# Patient Record
Sex: FEMALE | Race: WHITE | NOT HISPANIC OR LATINO | Employment: FULL TIME | URBAN - METROPOLITAN AREA
[De-identification: names, ages, dates, MRNs, and addresses within clinical notes are randomized per-mention and may not be internally consistent; named-entity substitution may affect disease eponyms.]

---

## 2019-10-30 ENCOUNTER — OFFICE VISIT (OUTPATIENT)
Dept: OPTOMETRY | Facility: CLINIC | Age: 29
End: 2019-10-30
Payer: COMMERCIAL

## 2019-10-30 DIAGNOSIS — H16.223 KERATOCONJUNCTIVITIS SICCA, NOT SPECIFIED AS SJÖGREN'S, BILATERAL: Primary | ICD-10-CM

## 2019-10-30 PROCEDURE — 99999 PR PBB SHADOW E&M-EST. PATIENT-LVL III: CPT | Mod: PBBFAC,,, | Performed by: OPTOMETRIST

## 2019-10-30 PROCEDURE — 99999 PR PBB SHADOW E&M-EST. PATIENT-LVL III: ICD-10-PCS | Mod: PBBFAC,,, | Performed by: OPTOMETRIST

## 2019-10-30 PROCEDURE — 92004 PR EYE EXAM, NEW PATIENT,COMPREHESV: ICD-10-PCS | Mod: S$GLB,,, | Performed by: OPTOMETRIST

## 2019-10-30 PROCEDURE — 92004 COMPRE OPH EXAM NEW PT 1/>: CPT | Mod: S$GLB,,, | Performed by: OPTOMETRIST

## 2019-10-30 NOTE — PROGRESS NOTES
HPI     Annual eye exam and contact fit   Blurry vision OU  Dailies Total 1 day CLs, Part time use    Last edited by Gustavo Langston, OD on 10/30/2019  3:41 PM. (History)            Assessment /Plan     For exam results, see Encounter Report.    Keratoconjunctivitis sicca, not specified as Sjögren's, bilateral  -Systane PRN  -Dailies lenses  Eyeglass Final Rx     Eyeglass Final Rx       Sphere Cylinder Dist VA    Right -1.75 Sphere 20/20    Left -2.00 Sphere 20/20    Type:  SVL    Expiration Date:  10/30/2020              Contact Lens Final Rx     Final Contact Lens Rx       Brand Base Curve Diameter Sphere Cylinder    Right Dailies Total 1 8.50 14.1 -1.75 Sphere    Left Dailies Total 1 8.50 14.1 -1.75 Sphere    Expiration Date:  10/30/2020    Replacement:  Daily    Wearing Schedule:  Daily wear                  RTC 1 yr

## 2020-12-30 ENCOUNTER — IMMUNIZATION (OUTPATIENT)
Dept: INTERNAL MEDICINE | Facility: CLINIC | Age: 30
End: 2020-12-30
Payer: COMMERCIAL

## 2020-12-30 DIAGNOSIS — Z23 NEED FOR VACCINATION: ICD-10-CM

## 2020-12-30 PROCEDURE — 0001A COVID-19, MRNA, LNP-S, PF, 30 MCG/0.3 ML DOSE VACCINE: ICD-10-PCS | Mod: CV19,,, | Performed by: FAMILY MEDICINE

## 2020-12-30 PROCEDURE — 91300 COVID-19, MRNA, LNP-S, PF, 30 MCG/0.3 ML DOSE VACCINE: CPT | Mod: ,,, | Performed by: FAMILY MEDICINE

## 2020-12-30 PROCEDURE — 91300 COVID-19, MRNA, LNP-S, PF, 30 MCG/0.3 ML DOSE VACCINE: ICD-10-PCS | Mod: ,,, | Performed by: FAMILY MEDICINE

## 2020-12-30 PROCEDURE — 0001A COVID-19, MRNA, LNP-S, PF, 30 MCG/0.3 ML DOSE VACCINE: CPT | Mod: CV19,,, | Performed by: FAMILY MEDICINE

## 2021-01-20 ENCOUNTER — IMMUNIZATION (OUTPATIENT)
Dept: INTERNAL MEDICINE | Facility: CLINIC | Age: 31
End: 2021-01-20
Payer: OTHER GOVERNMENT

## 2021-01-20 DIAGNOSIS — Z23 NEED FOR VACCINATION: Primary | ICD-10-CM

## 2021-01-20 PROCEDURE — 91300 COVID-19, MRNA, LNP-S, PF, 30 MCG/0.3 ML DOSE VACCINE: CPT | Mod: PBBFAC | Performed by: FAMILY MEDICINE

## 2021-01-20 PROCEDURE — 0002A COVID-19, MRNA, LNP-S, PF, 30 MCG/0.3 ML DOSE VACCINE: CPT | Mod: PBBFAC | Performed by: FAMILY MEDICINE

## 2021-09-30 ENCOUNTER — IMMUNIZATION (OUTPATIENT)
Dept: INTERNAL MEDICINE | Facility: CLINIC | Age: 31
End: 2021-09-30
Payer: OTHER GOVERNMENT

## 2021-09-30 DIAGNOSIS — Z23 NEED FOR VACCINATION: Primary | ICD-10-CM

## 2021-09-30 PROCEDURE — 0003A COVID-19, MRNA, LNP-S, PF, 30 MCG/0.3 ML DOSE VACCINE: CPT | Mod: CV19,PBBFAC | Performed by: INTERNAL MEDICINE

## 2021-09-30 PROCEDURE — 91300 COVID-19, MRNA, LNP-S, PF, 30 MCG/0.3 ML DOSE VACCINE: CPT | Mod: PBBFAC

## 2022-09-06 DIAGNOSIS — R05.9 COUGH: Primary | ICD-10-CM

## 2022-09-06 LAB
CTP QC/QA: YES
SARS-COV-2 AG RESP QL IA.RAPID: POSITIVE

## 2023-08-11 ENCOUNTER — PATIENT MESSAGE (OUTPATIENT)
Dept: RESEARCH | Facility: HOSPITAL | Age: 33
End: 2023-08-11
Payer: COMMERCIAL

## 2024-07-22 DIAGNOSIS — B00.2 ORAL HERPES: Primary | ICD-10-CM

## 2024-07-22 RX ORDER — VALACYCLOVIR HYDROCHLORIDE 1 G/1
1000 TABLET, FILM COATED ORAL 2 TIMES DAILY
Qty: 20 TABLET | Refills: 3 | Status: SHIPPED | OUTPATIENT
Start: 2024-07-22

## 2024-10-10 ENCOUNTER — OFFICE VISIT (OUTPATIENT)
Dept: PSYCHIATRY | Facility: CLINIC | Age: 34
End: 2024-10-10
Payer: COMMERCIAL

## 2024-10-10 VITALS
WEIGHT: 123.13 LBS | BODY MASS INDEX: 22.66 KG/M2 | SYSTOLIC BLOOD PRESSURE: 109 MMHG | DIASTOLIC BLOOD PRESSURE: 72 MMHG | HEIGHT: 62 IN | HEART RATE: 76 BPM

## 2024-10-10 DIAGNOSIS — F90.0 ADHD (ATTENTION DEFICIT HYPERACTIVITY DISORDER), INATTENTIVE TYPE: Primary | ICD-10-CM

## 2024-10-10 LAB
AMPHET+METHAMPHET UR QL: NEGATIVE
BARBITURATES UR QL SCN>200 NG/ML: NEGATIVE
BENZODIAZ UR QL SCN>200 NG/ML: NEGATIVE
BZE UR QL SCN: NEGATIVE
CANNABINOIDS UR QL SCN: NEGATIVE
CREAT UR-MCNC: 34 MG/DL (ref 15–325)
ETHANOL UR-MCNC: <10 MG/DL
METHADONE UR QL SCN>300 NG/ML: NEGATIVE
OPIATES UR QL SCN: NEGATIVE
PCP UR QL SCN>25 NG/ML: NEGATIVE
TOXICOLOGY INFORMATION: NORMAL

## 2024-10-10 PROCEDURE — 80307 DRUG TEST PRSMV CHEM ANLYZR: CPT

## 2024-10-10 PROCEDURE — 99999 PR PBB SHADOW E&M-EST. PATIENT-LVL III: CPT | Mod: PBBFAC,,,

## 2024-10-10 RX ORDER — DEXTROAMPHETAMINE SACCHARATE, AMPHETAMINE ASPARTATE, DEXTROAMPHETAMINE SULFATE AND AMPHETAMINE SULFATE 2.5; 2.5; 2.5; 2.5 MG/1; MG/1; MG/1; MG/1
10 TABLET ORAL 3 TIMES DAILY
Qty: 90 TABLET | Refills: 0 | Status: SHIPPED | OUTPATIENT
Start: 2024-10-10

## 2024-10-10 NOTE — PROGRESS NOTES
"OUTPATIENT PSYCHIATRY INITIAL VISIT    ENCOUNTER DATE:  10/10/2024  SITE:  [facility]  REFFERAL SOURCE:  Self, Aaareferral  LENGTH OF SESSION:  60 minutes        CHIEF COMPLAINT:   No chief complaint on file.      HISTORY OF PRESENTING ILLNESS:  Betina Elmore is a 34 y.o. female with history of ADHD who presents for initial assessment.      History as told by Patient -   Patient reports seeing a non-[facility] Psychiatrist, Dr. Martinez for 8 years. Reports Dr. Martinez has since suddenly retired and the patient has needed to. Reports she was treating her for ADHD.     Patient reports she was diagnosed with ADHD in 2007 by her PCP at the time and was managed by her PCP initially. Patient reports that as a child her doctors noticed easily distractibility and "loss of time" due to distractibility. Reports her mother informed her that her pediatrician had recommended medications in childhood but her mother did not want to do this. Reports she did well in school in classes that she was interested in, but in tedious classes she reports getting C's. Reports significant difficulty focusing on these classes that she found tedious. Reports during softball games and practice she would often day dream and have to be yelled at to be aware of where the ball was.  Reports at home she would get in trouble for being a "messy kid" and describes disorganization. Reports her mother her mother told her she was reactive as a child. She reports procrastination of work dating back to elementary school.Patient reports she has difficulty remembering parts of her childhood.   Patient reports discussing this later with her PCP after graduating highschool and thus was diagnosed. Patient reports seeking care because she felt she was struggling with class in a way her peers were not and felt she was not living up to her potential. Reports she was initially hesitant towards taking medications but she feels the medication " "significantly helps her on a day to day basis.     Reports she currently lives in "disorganized chaos". Reports when she gets overwhelmed she has difficulty initiating tasks and organizing. She reports misplacing items frequently. She reports being easily forgetful. She reports frequently zoning out and physically bumping into things as a result such as corners, feels this is also related to her moving too fast. Denies car accidents, reports she is actively trying to be a safe . She reports in some ways she is impulsive, reports impulsively reacting verbally. Reports interrupting others in conversation and feels she needs to express what she is thinking in that very moment as they come into her mind. Reports she has to actively prevent herself from doing this. Reports difficulty with racing thoughts, finds running is helpful for this. Reports she struggles when she has nothing to do and will feel restless. Reports she feels it is "physically and mentally exhausting" when she has nothing to do. Reports she has difficulty sustaining attention to television shows. Reports she has difficulty waiting in line and waiting her turn which she often avoids and if she is unable to avoid this results in irritability. She endorses difficulty with working memory in terms of remembering short lists without writing things down. Patient reports significant difficulty with future planning, especially if there are multi-steps in the process. She reports difficulty with procrastination and often puts things off to the last minute or is late, cites being behind on continuing education at the moment.     Reports hx of medication trials such as Vyvanse and Adderall XR. Reports extended release formulations have interfered with her ability to sleep in the past and made her feel like her heart was racing. Patient reports most recently being prescribed Adderall 10 mg TID. Denies adverse effects from this medication and feels it did not " interfere with her ability to sleep. Denies chest pain, chest tightness, palpitations. Reports she would take the first dose around 6:30 am, the second dose around 9;30 - 10;00, and then the last dose at 12:00 pm/ reports not taking a dose after 1:00 pm because this will interefere with her sleep. Reports she takes drug holidays, at least 1 day per week. Report she has been without Adderall for 2-3 weeks. Reports with medications the above symptoms are much more manageable for the patient. She reports feeling more stable on the medication. Denies personal cardiac hx or Fhx sudden cardiac death.     Denies hx of depression and anxiety diagnosis. Reports hx of experiencing anxiety following the passing of her Father and Sister. Reports she worried about things like mortality. Reports worrying about bad things happening such as fires or natural disasters. Denies feeling anxious more days than not. Reports when she feels this is difficult to control she uses running and mindfulness activities as coping skills. Reports running and mindfulness help. Denies any previous medications trials for anxiety.    PSYCHIATRIC REVIEW OF SYMPTOMS: (Is patient experiencing or having changes in any of the following?)    Symptoms of Depression: + loss of interest/anhedonia; Denies diminished mood or  irritability, diminished energy, change in sleep, change in appetite, diminished concentration or cognition or indecisiveness, PMA/R, excessive guilt or hopelessness or worthlessness, Denies suicidal ideations - Passive/ Active or Self injurious behaviors    Symptoms of SUSANNA: as above    Symptoms of Panic Attacks: Denies recurrent panic attacks.    Symptoms of marcia or hypomania: Denies elevated, expansive, or irritable mood with increased energy or activity; with inflated self-esteem or grandiosity, decreased need for sleep, increased rate of speech,  racing thoughts, distractibility, increased goal directed activity or PMA,  risky/disinhibited behavior.    Symptoms of psychosis: Denies hallucinations, delusions, disorganized thinking, disorganized behavior or abnormal motor behavior, or negative symptoms (diminshed emotional expression, avolition, anhedonia, alogia, asociality     Symptoms of PTSD: Denies h/o trauma - physical abuse /sexual abuse / domestic violence/ other traumas); re-experiencing/intrusive symptoms, nightmares, avoidant behavior, negative alterations in cognition or mood, or hyperarousal symptoms; with or without dissociative symptoms     Sleep: Endorses hx of sleep paralysis starting back in childhood. Reports this happens every few weeks. Endorses hx of hypnopompic/hypnagogic hallucinations in childhood.     Other Psych ROS:    Symptoms of OCD: Denies obsessions, compulsions or ruminations    Symptoms of Eating Disorders: Denies anorexia, bulimia or binge eating    Symptoms of ADHD: +inattention since childhood    Risk Parameters:  Patient reports no suicidal ideation  Patient reports no homicidal ideation  Patient reports no self-injurious behavior  Patient reports no violent behavior    PSYCHIATRIC MED REVIEW    Current psych meds  Medication side effects:  denies  Medication compliance:  yes    Previous psych meds trials    PAST PSYCHIATRIC HISTORY:  Previous Psychiatric Diagnoses: ADHD  Previous Psychiatric Hospitalizations: denies   Previous SI/HI:   denies  Previous Suicide Attempts:  denies  Previous Self injurious behaviors: denies  Previous Medication Trials:  Adderall XR, Vyvanse  Psychiatric Care (current & past):  Dr. Martinez  History of Psychotherapy:  was working with a therapist a few months ago, she moved away  History of Violence: denies    SUBSTANCE ABUSE HISTORY:  Caffeine: coffee 16 oz per day, doesn't drink caffeine after 11 am  Tobacco:  denies  Alcohol:  2-3 times per month  Illicit Substances:  denies  Misuse of Prescription Medications: denies  Other: Herbal supplements/ online supplements:  denies    FAMILY HISTORY:  Psychiatric:  Younger brother - depression/anxiety  Family H/o suicide:  denies    SOCIAL HISTORY:  Developmental/Childhood:Achieved all developmental milestones timely  Education:Bachelor's Degree  Employment Status/Finances:Employed   Relationship Status/Sexual Orientation:   Children: 0  Housing Status: Home alone   history:  NO  Access to Firearms: NO  Synagogue:Spiritual without formal affiliation  Recreational activities: running    LEGAL HISTORY:   Past charges/incarcerations: No   Pending charges:No     NEUROLOGIC HISTORY:  Seizures:  denies   Head trauma:  denies    MEDICAL REVIEW OF SYSTEMS:  Complete review of systems performed covering Constitutional, Cardiovascular, Respiratory, Gastrointestinal, Musculoskeletal, Skin, Neurologic and Endocrine  All systems negative.    MEDICAL HISTORY:  No past medical history on file.    ALL MEDICATIONS:    Current Outpatient Medications:     clindamycin (CLEOCIN) 300 MG capsule, Take 1 capsule (300 mg total) by mouth 4 (four) times daily., Disp: 28 capsule, Rfl: 1    dextroamphetamine-amphetamine 10 mg Tab, Take one tablet by mouth three times daily, Disp: 90 tablet, Rfl: 0    dextroamphetamine-amphetamine 10 mg Tab, Take 1 tablet by mouth three times daily, Disp: 90 tablet, Rfl: 0    dextroamphetamine-amphetamine 10 mg Tab, Take 1 tablet (10 mg total) by mouth 3 (three) times daily., Disp: 90 tablet, Rfl: 0    ondansetron (ZOFRAN-ODT) 4 MG TbDL, Dissolve 1 tablet (4 mg total) by mouth daily as needed., Disp: 20 tablet, Rfl: 0    sulfamethoxazole-trimethoprim 800-160mg (BACTRIM DS) 800-160 mg Tab, TAKE 1 TABLET BY MOUTH TWICE DAILY, Disp: 6 tablet, Rfl: 0    valACYclovir (VALTREX) 1000 MG tablet, take 2 tablets by mouth every 12 hours, Disp: 8 tablet, Rfl: 1    valACYclovir (VALTREX) 1000 MG tablet, Take 1 tablet (1,000 mg total) by mouth 2 (two) times a day., Disp: 20 tablet, Rfl: 3    OTC: Tums    ALLERGIES:  Review of patient's  "allergies indicates:   Allergen Reactions    Penicillin g Hives and Itching       RELEVANT LABS/STUDIES:    No results found for: "WBC", "HGB", "HCT", "MCV", "PLT"  BMP  No results found for: "NA", "K", "CL", "CO2", "BUN", "CREATININE", "CALCIUM", "ANIONGAP", "ESTGFRAFRICA", "EGFRNONAA"  No results found for: "ALT", "AST", "GGT", "ALKPHOS", "BILITOT"  No results found for: "TSH"  No results found for: "LABA1C", "HGBA1C"      VITALS  Vitals:    10/10/24 0812   BP: 109/72   Pulse: 76   Weight: 55.8 kg (123 lb 2 oz)   Height: 5' 2" (1.575 m)       PHYSICAL EXAM  General: well developed, well nourished  Neurologic:   Gait: Normal   Psychomotor signs:  No involuntary movements or tremor  AIMS: none    PSYCHIATRIC EXAM:    Mental Status Exam:  Appearance: unremarkable, age appropriate  Behavior/Cooperation: limited/ appropriate normal, cooperative  Speech:  normal tone, normal rate, normal pitch, normal volume  Language: uses words appropriately; NO aphasia or dysarthria  Mood:  "fine"  Affect:  full, reactive, appropriate  Thought Process: normal and logical  Thought Content: normal, no suicidality, no homicidality, delusions, or paranoia  Level of Consciousness: Alert and Oriented x3  Memory:  Intact   3/3 immediate, 3/3 at 5 minutes    Recent:  Intact; able to report recent events   Remote:  Intact; Named 4/4 past presidents   Attention/concentration: appropriate for age/education.   Fund of Knowledge: appears adequate  Insight:   Intact  Judgment:  Intact       IMPRESSION:    Betina Elmore is a 34 y.o. female with history of ADHD who presents for initial assessment.    DIAGNOSES:    ICD-10-CM ICD-9-CM   1. ADHD (attention deficit hyperactivity disorder), inattentive type  F90.0 314.00       PLAN:  Psych Med:  Continue Adderall 10 mg TID for ADHD  VS reviewed as stable  PDMP reviewed without discrepancies  Denies cp, palpitations, chest tightness  Denies personal cardiac hx, Fhx sudden cardiac death  Discussed with " patient informed consent, risks vs. benefits, alternative treatments, side effect profile and the inherent unpredictability of individual responses to these treatments. Answered any questions patient may have had. The patient expresses understanding of the above and displays the capacity to agree with this current plan     Other: UDS obtained at this visit    RETURN TO CLINIC:  4 weeks    Evelin Birch MD   [facility] PGY-III  10/10/2024 8:15 AM

## 2024-11-15 ENCOUNTER — OFFICE VISIT (OUTPATIENT)
Dept: PSYCHIATRY | Facility: CLINIC | Age: 34
End: 2024-11-15
Payer: COMMERCIAL

## 2024-11-15 VITALS
DIASTOLIC BLOOD PRESSURE: 72 MMHG | WEIGHT: 121.06 LBS | BODY MASS INDEX: 22.14 KG/M2 | HEART RATE: 70 BPM | SYSTOLIC BLOOD PRESSURE: 112 MMHG

## 2024-11-15 DIAGNOSIS — F90.0 ADHD (ATTENTION DEFICIT HYPERACTIVITY DISORDER), INATTENTIVE TYPE: Primary | ICD-10-CM

## 2024-11-15 DIAGNOSIS — F41.9 ANXIETY DISORDER, UNSPECIFIED TYPE: ICD-10-CM

## 2024-11-15 PROCEDURE — 99999 PR PBB SHADOW E&M-EST. PATIENT-LVL II: CPT | Mod: PBBFAC,,,

## 2024-11-15 RX ORDER — DEXTROAMPHETAMINE SACCHARATE, AMPHETAMINE ASPARTATE, DEXTROAMPHETAMINE SULFATE AND AMPHETAMINE SULFATE 2.5; 2.5; 2.5; 2.5 MG/1; MG/1; MG/1; MG/1
10 TABLET ORAL 3 TIMES DAILY
Qty: 90 TABLET | Refills: 0 | Status: SHIPPED | OUTPATIENT
Start: 2024-11-15

## 2024-11-15 NOTE — PROGRESS NOTES
"OUTPATIENT PSYCHIATRY FOLLOW UP VISIT    ENCOUNTER DATE:  11/15/2024  SITE:  [facility]  LENGTH OF SESSION:  30 minutes      CHIEF COMPLAINT:  No chief complaint on file.      HISTORY OF PRESENTING ILLNESS:  Betina Elmore is a 34 y.o. female with history of ADHD who presents for follow up appointment.      Plan at last appointment on 10/10/2024:  Continue Adderall 10 mg TID for ADHD  VS reviewed as stable  PDMP reviewed without discrepancies  Denies cp, palpitations, chest tightness  Denies personal cardiac hx, Fhx sudden cardiac death    Interval history as told by Patient -     Taking Adderall as prescribed. Takes one dose at approx. 6:30 - 7:30 am, takes second dose 2-3 hrs later, then last dose 2 hours after last dose. Tries to not take medication after 12:30 -1:00 pm. Denies forgetting to take medication. Takes 1 weekend day drug holiday. Reports her mood has been stable and even "better" since last visit. Notices some changes with mood on gloomy days. Reports anxiety has been okay, has been trying to use coping skills such as breathing. Cites she has been more able to manage fear of fire happening in her apartment. Feels the anxiety is most of the time manageable. Cites days when the weather is bad she notices her anxiety is worse. Reports she has thought about treating anxiety and depression. Worries about starting SSRI/SNRI for fear of increased anxiety. Worries about discontinuation syndromes. Worries about weight gain. Discussed SSRI options v. Alternative options such as Buspar.    PSYCHIATRIC REVIEW OF SYSTEMS:(none/ yes- better/worse/stable/& what symptoms)    Symptoms of Depression: improved    Symptoms of Anxiety/ panic attacks: stable, practicing coping skills    Symptoms of Park/Hypomania: denies    Symptoms of psychosis: denies AVH or paranoia    Sleep: stable    Appetite: stable, drinks water    Other: ADHD well managed    Alcohol: 2-3x per month    Illicit Drugs: " denies    Psychosocial stressors: work, fear of fire in her appartment    Risk Parameters:  Patient reports no suicidal ideation  Patient reports no homicidal ideation  Patient reports no self-injurious behavior  Patient reports no violent behavior    PSYCHIATRIC MED REVIEW    Current psych meds  Medication side effects:  denies  Medication compliance:  yes    Previous psych meds trials  Vyvanse - insomnia  Adderall XR - insomnia     PAST PSYCHIATRIC, MEDICAL, AND SOCIAL HISTORY REVIEWED  The patient's past medical, family and social history have been reviewed and updated as appropriate within the electronic medical record - see encounter notes.    MEDICAL REVIEW OF SYSTEMS:  Complete review of systems performed covering Constitutional, Musculoskeletal, Neurologic.  All systems negative.    ALL MEDICATIONS:    Current Outpatient Medications:     clindamycin (CLEOCIN) 300 MG capsule, Take 1 capsule (300 mg total) by mouth 4 (four) times daily., Disp: 28 capsule, Rfl: 1    clindamycin (CLEOCIN) 300 MG capsule, Take one capsule by mouth three times daily until gone., Disp: 14 capsule, Rfl: 0    dextroamphetamine-amphetamine 10 mg Tab, Take 1 tablet (10 mg total) by mouth 3 (three) times daily., Disp: 90 tablet, Rfl: 0    ibuprofen (ADVIL,MOTRIN) 600 MG tablet, Take 1 tablet by mouth every 6 hours as needed for pain, Disp: 20 tablet, Rfl: 1    sulfamethoxazole-trimethoprim 800-160mg (BACTRIM DS) 800-160 mg Tab, TAKE 1 TABLET BY MOUTH TWICE DAILY, Disp: 6 tablet, Rfl: 0    traMADoL (ULTRAM) 50 mg tablet, take one tablet every 4 hours as needed for pain, Disp: 16 tablet, Rfl: 0    valACYclovir (VALTREX) 1000 MG tablet, take 2 tablets by mouth every 12 hours, Disp: 8 tablet, Rfl: 1    valACYclovir (VALTREX) 1000 MG tablet, Take 1 tablet (1,000 mg total) by mouth 2 (two) times a day., Disp: 20 tablet, Rfl: 3    ALLERGIES:  Review of patient's allergies indicates:   Allergen Reactions    Penicillin g Hives and Itching  "      RELEVANT LABS/STUDIES:    No results found for: "WBC", "HGB", "HCT", "MCV", "PLT"  BMP  No results found for: "NA", "K", "CL", "CO2", "BUN", "CREATININE", "CALCIUM", "ANIONGAP", "ESTGFRAFRICA", "EGFRNONAA"  No results found for: "ALT", "AST", "GGT", "ALKPHOS", "BILITOT"  No results found for: "TSH"  No results found for: "LABA1C", "HGBA1C"    VITALS  Vitals:    11/15/24 0934   BP: 112/72   Pulse: 70   Weight: 54.9 kg (121 lb 0.5 oz)       PHYSICAL EXAM  General: well developed, well nourished  Neurologic:   Gait: Normal   Psychomotor signs:  No involuntary movements or tremor  AIMS: none    PSYCHIATRIC EXAM:     Mental Status Exam:  Appearance: unremarkable, age appropriate  Behavior/Cooperation: normal, cooperative  Speech: normal tone, normal rate, normal pitch, normal volume  Language: uses words appropriately; NO aphasia or dysarthria  Mood:  "better"  Affect: full, reactive, appropriate  Thought Process: normal and logical  Thought Content: normal, no suicidality, no homicidality, delusions, or paranoia  Level of Consciousness: Alert and Oriented x3  Memory:  Intact   3/3 immediate, 3/3 at 5 minutes    Recent:   Intact; able to report recent events   Remote:  Intact; Named 4/4 past presidents   Attention/concentration: appropriate for age/education.   Fund of Knowledge: appears adequate  Insight:   Intact  Judgment:  Intact       IMPRESSION:    Betina Elmore is a 34 y.o. female with history of ADHD who presents for follow up appointment for ADHD and anxiety.    Status/Progress:  Based on the examination today, the patient's problem(s) is/are well controlled.  New problems have not been presented today.     DIAGNOSES:    ICD-10-CM ICD-9-CM   1. ADHD (attention deficit hyperactivity disorder), inattentive type  F90.0 314.00   2. Anxiety disorder, unspecified type  F41.9 300.00       PLAN:  Psych Med:  Continue Adderall 10 mg TID for ADHD  VS reviewed as stable  UDS negative  PDMP reviewed without " discrepancies - last filled 10/10/24 for 30d supply  Denies cp, palpitations, chest tightness  Denies personal cardiac hx, Fhx sudden cardiac death  Patient is considering SSRI v. Buspar for anxiety - wishes to do continued research, will plan to discuss at future visits  Discussed with patient informed consent, risks vs. benefits, alternative treatments, side effect profile and the inherent unpredictability of individual responses to these treatments. Answered any questions patient may have had. The patient expresses understanding of the above and displays the capacity to agree with this current plan     Other: N/A    RETURN TO CLINIC:  4 -6 weeks    Evelin Birch MD   [facility] PGY-III  11/15/2024 9:37 AM

## 2024-11-27 ENCOUNTER — PATIENT MESSAGE (OUTPATIENT)
Dept: PSYCHIATRY | Facility: CLINIC | Age: 34
End: 2024-11-27
Payer: COMMERCIAL

## 2024-12-13 ENCOUNTER — OFFICE VISIT (OUTPATIENT)
Dept: PSYCHIATRY | Facility: CLINIC | Age: 34
End: 2024-12-13
Payer: COMMERCIAL

## 2024-12-13 VITALS
HEART RATE: 79 BPM | BODY MASS INDEX: 21.93 KG/M2 | HEIGHT: 62 IN | WEIGHT: 119.19 LBS | DIASTOLIC BLOOD PRESSURE: 78 MMHG | SYSTOLIC BLOOD PRESSURE: 120 MMHG

## 2024-12-13 DIAGNOSIS — F90.0 ADHD (ATTENTION DEFICIT HYPERACTIVITY DISORDER), INATTENTIVE TYPE: ICD-10-CM

## 2024-12-13 DIAGNOSIS — F41.9 ANXIETY DISORDER, UNSPECIFIED TYPE: Primary | ICD-10-CM

## 2024-12-13 PROCEDURE — 99999 PR PBB SHADOW E&M-EST. PATIENT-LVL II: CPT | Mod: PBBFAC,,,

## 2024-12-13 RX ORDER — BUSPIRONE HYDROCHLORIDE 5 MG/1
TABLET ORAL
Qty: 60 TABLET | Refills: 0 | Status: SHIPPED | OUTPATIENT
Start: 2024-12-13

## 2024-12-13 RX ORDER — DEXTROAMPHETAMINE SACCHARATE, AMPHETAMINE ASPARTATE, DEXTROAMPHETAMINE SULFATE AND AMPHETAMINE SULFATE 2.5; 2.5; 2.5; 2.5 MG/1; MG/1; MG/1; MG/1
10 TABLET ORAL 3 TIMES DAILY
Qty: 90 TABLET | Refills: 0 | Status: SHIPPED | OUTPATIENT
Start: 2024-12-13

## 2024-12-13 NOTE — PROGRESS NOTES
OUTPATIENT PSYCHIATRY FOLLOW UP VISIT    ENCOUNTER DATE:  12/13/2024  SITE:  [facility], [facility]  LENGTH OF SESSION:  30 minutes      CHIEF COMPLAINT:  No chief complaint on file.      HISTORY OF PRESENTING ILLNESS:  Betina Elmore is a 34 y.o. female with history of ADHD who presents for follow up appointment.      Plan at last appointment on 10/10/2024:  Continue Adderall 10 mg TID for ADHD  VS reviewed as stable  UDS negative  PDMP reviewed without discrepancies - last filled 10/10/24 for 30d supply  Denies cp, palpitations, chest tightness  Denies personal cardiac hx, Fhx sudden cardiac death  Patient is considering SSRI v. Buspar for anxiety - wishes to do continued research, will plan to discuss at future visits    Interval history as told by Patient -     Patient reports she things have been going well. Reports anxiety has continued to be problematic. Reports she feels anxiety is worse than baseline and she does not have a reason why. She reports she is open to trying Buspar for anxiety at this time.   Takes Adderall almost daily. Takes one weekend drug holiday. Denies adverse effects including insomnia, suppressed appetite, chest pain, chest tightness, palpitations. Feels Adderall is helpful. Answered questions for patient regarding Buspar.    PSYCHIATRIC REVIEW OF SYSTEMS:(none/ yes- better/worse/stable/& what symptoms)    Symptoms of Depression:stable    Symptoms of Anxiety/ panic attacks: unchanged, interested in medication trial     Symptoms of Park/Hypomania: denies    Symptoms of psychosis: denies AVH or paranoia    Sleep: stable    Appetite: stable, drinks water    Other: ADHD well managed    Alcohol: 2-3x per month, no changes    Illicit Drugs: denies    Psychosocial stressors: work, family/interpersonal relationships, the holidays    Risk Parameters:  Patient reports no suicidal ideation  Patient reports no homicidal ideation  Patient reports no self-injurious behavior  Patient  "reports no violent behavior    PSYCHIATRIC MED REVIEW    Current psych meds  Medication side effects: denies  Medication compliance: yes, takes drug holiday once per weekend    Previous psych meds trials  Vyvanse - insomnia  Adderall XR - insomnia     PAST PSYCHIATRIC, MEDICAL, AND SOCIAL HISTORY REVIEWED  The patient's past medical, family and social history have been reviewed and updated as appropriate within the electronic medical record - see encounter notes.    MEDICAL REVIEW OF SYSTEMS:  Complete review of systems performed covering Constitutional, Musculoskeletal, Neurologic. All systems negative except for cold sore.    ALL MEDICATIONS:    Current Outpatient Medications:     clindamycin (CLEOCIN) 300 MG capsule, Take 1 capsule (300 mg total) by mouth 4 (four) times daily., Disp: 28 capsule, Rfl: 1    clindamycin (CLEOCIN) 300 MG capsule, Take one capsule by mouth three times daily until gone., Disp: 14 capsule, Rfl: 0    dextroamphetamine-amphetamine 10 mg Tab, Take 1 tablet (10 mg total) by mouth 3 (three) times daily., Disp: 90 tablet, Rfl: 0    ibuprofen (ADVIL,MOTRIN) 600 MG tablet, Take 1 tablet by mouth every 6 hours as needed for pain, Disp: 20 tablet, Rfl: 1    sulfamethoxazole-trimethoprim 800-160mg (BACTRIM DS) 800-160 mg Tab, TAKE 1 TABLET BY MOUTH TWICE DAILY, Disp: 6 tablet, Rfl: 0    traMADoL (ULTRAM) 50 mg tablet, take one tablet every 4 hours as needed for pain, Disp: 16 tablet, Rfl: 0    valACYclovir (VALTREX) 1000 MG tablet, take 2 tablets by mouth every 12 hours, Disp: 8 tablet, Rfl: 1    valACYclovir (VALTREX) 1000 MG tablet, Take 1 tablet (1,000 mg total) by mouth 2 (two) times a day., Disp: 20 tablet, Rfl: 3      ALLERGIES:  Review of patient's allergies indicates:   Allergen Reactions    Penicillin g Hives and Itching       RELEVANT LABS/STUDIES:    No results found for: "WBC", "HGB", "HCT", "MCV", "PLT"  BMP  No results found for: "NA", "K", "CL", "CO2", "BUN", "CREATININE", " ""CALCIUM", "ANIONGAP", "ESTGFRAFRICA", "EGFRNONAA"  No results found for: "ALT", "AST", "GGT", "ALKPHOS", "BILITOT"  No results found for: "TSH"  No results found for: "LABA1C", "HGBA1C"    VITALS  Vitals:    12/13/24 1532   BP: 120/78   Pulse: 79   Weight: 54.1 kg (119 lb 2.5 oz)   Height: 5' 2" (1.575 m)       PHYSICAL EXAM  General: well developed, well nourished  Neurologic:   Gait: Normal   Psychomotor signs:  No involuntary movements or tremor  AIMS: none    PSYCHIATRIC EXAM:     Mental Status Exam:  Appearance: unremarkable, age appropriate  Behavior/Cooperation: normal, cooperative  Speech: normal tone, normal rate, normal pitch, normal volume  Language: uses words appropriately; NO aphasia or dysarthria  Mood:  "good"  Affect: full, reactive, appropriate  Thought Process: normal and logical  Thought Content: normal, no suicidality, no homicidality, delusions, or paranoia  Level of Consciousness: Alert and Oriented x3  Memory:  Intact   3/3 immediate, 3/3 at 5 minutes    Recent:   Intact; able to report recent events   Remote:  Intact; Named 4/4 past presidents   Attention/concentration: appropriate for age/education.   Fund of Knowledge: appears adequate  Insight:   Intact  Judgment:  Intact       IMPRESSION:    Betina Elmore is a 34 y.o. female with history of ADHD who presents for follow up appointment for ADHD and anxiety.    Status/Progress:  Based on the examination today, the patient's problem(s) is/are well controlled. New problems have not been presented today.     DIAGNOSES:    ICD-10-CM ICD-9-CM   1. Anxiety disorder, unspecified type  F41.9 300.00   2. ADHD (attention deficit hyperactivity disorder), inattentive type  F90.0 314.00       PLAN:  Psych Med:  START Buspar 5 mg daily for one week and then 5 mg BID thereafter if tolerating well  Continue Adderall 10 mg TID for ADHD  VS reviewed as stable  UDS negative  PDMP reviewed without discrepancies - last filled 11/15/24 for 30d supply  Denies " cp, palpitations, chest tightness  Denies personal cardiac hx, Fhx sudden cardiac death  Discussed with patient informed consent, risks vs. benefits, alternative treatments, side effect profile and the inherent unpredictability of individual responses to these treatments. Answered any questions patient may have had. The patient expresses understanding of the above and displays the capacity to agree with this current plan     Other: N/A    RETURN TO CLINIC: 4 weeks    Evelin Birch MD   [facility] PGY-III  12/13/2024 9:37 AM

## 2025-01-22 ENCOUNTER — PATIENT MESSAGE (OUTPATIENT)
Dept: PSYCHIATRY | Facility: CLINIC | Age: 35
End: 2025-01-22
Payer: COMMERCIAL

## 2025-01-31 ENCOUNTER — OFFICE VISIT (OUTPATIENT)
Dept: PSYCHIATRY | Facility: CLINIC | Age: 35
End: 2025-01-31
Payer: COMMERCIAL

## 2025-01-31 DIAGNOSIS — F41.9 ANXIETY DISORDER, UNSPECIFIED TYPE: Primary | ICD-10-CM

## 2025-01-31 DIAGNOSIS — F90.0 ADHD (ATTENTION DEFICIT HYPERACTIVITY DISORDER), INATTENTIVE TYPE: ICD-10-CM

## 2025-01-31 PROCEDURE — 98006 SYNCH AUDIO-VIDEO EST MOD 30: CPT | Mod: 95,,,

## 2025-01-31 RX ORDER — DEXTROAMPHETAMINE SACCHARATE, AMPHETAMINE ASPARTATE, DEXTROAMPHETAMINE SULFATE AND AMPHETAMINE SULFATE 2.5; 2.5; 2.5; 2.5 MG/1; MG/1; MG/1; MG/1
10 TABLET ORAL 3 TIMES DAILY
Qty: 90 TABLET | Refills: 0 | Status: SHIPPED | OUTPATIENT
Start: 2025-02-28

## 2025-01-31 RX ORDER — DEXTROAMPHETAMINE SACCHARATE, AMPHETAMINE ASPARTATE, DEXTROAMPHETAMINE SULFATE AND AMPHETAMINE SULFATE 2.5; 2.5; 2.5; 2.5 MG/1; MG/1; MG/1; MG/1
10 TABLET ORAL 3 TIMES DAILY
Qty: 90 TABLET | Refills: 0 | Status: SHIPPED | OUTPATIENT
Start: 2025-01-31

## 2025-01-31 RX ORDER — BUSPIRONE HYDROCHLORIDE 5 MG/1
TABLET ORAL
Qty: 60 TABLET | Refills: 1 | Status: SHIPPED | OUTPATIENT
Start: 2025-01-31

## 2025-01-31 NOTE — PROGRESS NOTES
"OUTPATIENT PSYCHIATRY FOLLOW UP VISIT    ENCOUNTER DATE:  1/31/2025  SITE:  [facility], [facility]  LENGTH OF SESSION:  20 minutes    TELE PSYCHIATRY Disclaimer   *The patient was informed despite using HIPPA compliant technology there may be risks including security breach, technological failure, inability to perform a comprehensive physical exam which could delay or prevent an accurate diagnosis, and potential complications from treatment decisions rendered over a telemedical platform. The patient understands and consented to the use of tele-health service.The patient was also informed of the relationship between the physician and patient and the respective role of any other health care provider with respect to management of the patient; and notified that the pt may decline to receive medical services by telemedicine and may withdraw from such care at any time.     Visit type: Virtual visit with synchronous audio and video  Total time spent with patient: 15 minutes       CHIEF COMPLAINT:  No chief complaint on file.      HISTORY OF PRESENTING ILLNESS:  Betina Elmore is a 34 y.o. female with history of ADHD who presents for follow up appointment.      Plan at last appointment on 12/13/24.  START Buspar 5 mg daily for one week and then 5 mg BID thereafter if tolerating well  Continue Adderall 10 mg TID for ADHD  VS reviewed as stable  UDS negative  PDMP reviewed without discrepancies - last filled 11/15/24 for 30d supply  Denies cp, palpitations, chest tightness  Denies personal cardiac hx, Fhx sudden cardiac death    Interval history as told by Patient -     Reports things are going "good" since last visit. Reports she took a few days to start Buspar due to concerns for GI upset. Reports when she started Buspar she was already taking antibiotics and having GI upset and is unsure if this is related to the Buspar. Reports GI upset has resolved. Reports some intermittent lightheadedness. Reports she " feels Buspar is helpful for anxiety and has helped her feel more baseline. Reports taking Buspar as 7:30 and 12:30 -1 pm. Denies adverse effects from Adderall, denies chest pain, palpitations, chest tightness. Taking as TID, takes drug holidays from Adderall. Feels current regimen is helpful and wishes to continue current regimen is as at this time.    PSYCHIATRIC REVIEW OF SYSTEMS:(none/ yes- better/worse/stable/& what symptoms)    Symptoms of Depression:stable    Symptoms of Anxiety/ panic attacks: improved.    Symptoms of Park/Hypomania: denies    Symptoms of psychosis: denies AVH or paranoia    Sleep: reports she is waking in the night and this is unusual for her. Reports she feels her mind wakes her up. Reports difficulty falling back asleep. Reports sleep is more consistent again at this time.    Appetite: stable, drinks water    Other: ADHD well managed    Alcohol: 2-3x per month, no changes    Illicit Drugs: denies    Psychosocial stressors: work, family/interpersonal relationships, the holidays    Risk Parameters:  Patient reports no suicidal ideation  Patient reports no homicidal ideation  Patient reports no self-injurious behavior  Patient reports no violent behavior    PSYCHIATRIC MED REVIEW    Current psych meds  Medication side effects: denies  Medication compliance: yes, takes drug holiday once per weekend    Previous psych meds trials  Vyvanse - insomnia  Adderall XR - insomnia     PAST PSYCHIATRIC, MEDICAL, AND SOCIAL HISTORY REVIEWED  The patient's past medical, family and social history have been reviewed and updated as appropriate within the electronic medical record - see encounter notes.    MEDICAL REVIEW OF SYSTEMS:  Complete review of systems performed covering Constitutional, Musculoskeletal, Neurologic. All systems negative.    ALL MEDICATIONS:    Current Outpatient Medications:     busPIRone (BUSPAR) 5 MG Tab, Take 1 tablet (5 mg) by mouth once daily for a week followed by 1 tablet by  "mouth twice daily thereafter if tolerating., Disp: 60 tablet, Rfl: 0    clindamycin (CLEOCIN) 300 MG capsule, Take 1 capsule (300 mg total) by mouth 4 (four) times daily., Disp: 28 capsule, Rfl: 1    clindamycin (CLEOCIN) 300 MG capsule, Take one capsule by mouth three times daily until gone., Disp: 14 capsule, Rfl: 0    dextroamphetamine-amphetamine 10 mg Tab, Take 1 tablet (10 mg total) by mouth 3 (three) times daily., Disp: 90 tablet, Rfl: 0    ibuprofen (ADVIL,MOTRIN) 600 MG tablet, Take 1 tablet by mouth every 6 hours as needed for pain, Disp: 20 tablet, Rfl: 1    sulfamethoxazole-trimethoprim 800-160mg (BACTRIM DS) 800-160 mg Tab, TAKE 1 TABLET BY MOUTH TWICE DAILY, Disp: 6 tablet, Rfl: 0    traMADoL (ULTRAM) 50 mg tablet, take one tablet every 4 hours as needed for pain, Disp: 16 tablet, Rfl: 0    valACYclovir (VALTREX) 1000 MG tablet, take 2 tablets by mouth every 12 hours, Disp: 8 tablet, Rfl: 1    valACYclovir (VALTREX) 1000 MG tablet, Take 1 tablet (1,000 mg total) by mouth 2 (two) times a day., Disp: 20 tablet, Rfl: 3    *Completed 5d course of abx for UTI     ALLERGIES:  Review of patient's allergies indicates:   Allergen Reactions    Penicillin g Hives and Itching       RELEVANT LABS/STUDIES:    No results found for: "WBC", "HGB", "HCT", "MCV", "PLT"  BMP  No results found for: "NA", "K", "CL", "CO2", "BUN", "CREATININE", "CALCIUM", "ANIONGAP", "ESTGFRAFRICA", "EGFRNONAA"  No results found for: "ALT", "AST", "GGT", "ALKPHOS", "BILITOT"  No results found for: "TSH"  No results found for: "LABA1C", "HGBA1C"    VITALS  There were no vitals filed for this visit.    PHYSICAL EXAM  General: well developed, well nourished  Neurologic:   Gait: Normal   Psychomotor signs:  No involuntary movements or tremor  AIMS: none    PSYCHIATRIC EXAM:     Mental Status Exam:  Appearance: unremarkable, age appropriate  Behavior/Cooperation: normal, cooperative  Speech: normal tone, normal rate, normal pitch, normal " "volume  Language: uses words appropriately; NO aphasia or dysarthria  Mood:  "good"  Affect: full, reactive, appropriate  Thought Process: normal and logical  Thought Content: normal, no suicidality, no homicidality, delusions, or paranoia  Level of Consciousness: Alert and Oriented x3  Memory:  Intact   3/3 immediate, 3/3 at 5 minutes    Recent:   Intact; able to report recent events   Remote:  Intact; Named 4/4 past presidents   Attention/concentration: appropriate for age/education.   Fund of Knowledge: appears adequate  Insight:   Intact  Judgment:  Intact       IMPRESSION:    Betina Elmore is a 34 y.o. female with history of ADHD who presents for follow up appointment for ADHD and anxiety.    Status/Progress:  Based on the examination today, the patient's problem(s) is/are improved. New problems have not been presented today.     DIAGNOSES:    ICD-10-CM ICD-9-CM   1. Anxiety disorder, unspecified type  F41.9 300.00   2. ADHD (attention deficit hyperactivity disorder), inattentive type  F90.0 314.00         PLAN:  Psych Med:  Continue Buspar 5 mg BID  Continue Adderall 10 mg TID for ADHD  VS reviewed as stable  UDS negative  PDMP reviewed without discrepancies - last filled 12/17/24 for 30d supply  Denies cp, palpitations, chest tightness  Denies personal cardiac hx, Fhx sudden cardiac death  Discussed with patient informed consent, risks vs. benefits, alternative treatments, side effect profile and the inherent unpredictability of individual responses to these treatments. Answered any questions patient may have had. The patient expresses understanding of the above and displays the capacity to agree with this current plan     Other: N/A    RETURN TO CLINIC: 8 weeks or sooner as needed.    Evelin Birch MD   [facility] PGY-III  1/31/2025 9:37 AM           "

## 2025-02-14 NOTE — PROGRESS NOTES
STAFF COMMENTS: I have discussed pt with Dr. Birch and reviewed the history and exam. I agree and concur with the assessment and plan.    Sen Dexter MD  [facility]

## 2025-04-04 ENCOUNTER — OFFICE VISIT (OUTPATIENT)
Dept: PSYCHIATRY | Facility: CLINIC | Age: 35
End: 2025-04-04
Payer: COMMERCIAL

## 2025-04-04 DIAGNOSIS — F41.9 ANXIETY DISORDER, UNSPECIFIED TYPE: Primary | ICD-10-CM

## 2025-04-04 DIAGNOSIS — F90.0 ADHD (ATTENTION DEFICIT HYPERACTIVITY DISORDER), INATTENTIVE TYPE: ICD-10-CM

## 2025-04-04 RX ORDER — BUSPIRONE HYDROCHLORIDE 5 MG/1
TABLET ORAL
Qty: 60 TABLET | Refills: 1 | Status: SHIPPED | OUTPATIENT
Start: 2025-04-04

## 2025-04-04 RX ORDER — DEXTROAMPHETAMINE SACCHARATE, AMPHETAMINE ASPARTATE, DEXTROAMPHETAMINE SULFATE AND AMPHETAMINE SULFATE 2.5; 2.5; 2.5; 2.5 MG/1; MG/1; MG/1; MG/1
10 TABLET ORAL 3 TIMES DAILY
Qty: 90 TABLET | Refills: 0 | Status: SHIPPED | OUTPATIENT
Start: 2025-04-04

## 2025-04-04 NOTE — PROGRESS NOTES
"OUTPATIENT PSYCHIATRY FOLLOW UP VISIT    ENCOUNTER DATE:  4/4/2025  SITE:  [facility], [facility]  LENGTH OF SESSION:  20 minutes    TELE PSYCHIATRY Disclaimer   *The patient was informed despite using HIPPA compliant technology there may be risks including security breach, technological failure, inability to perform a comprehensive physical exam which could delay or prevent an accurate diagnosis, and potential complications from treatment decisions rendered over a telemedical platform. The patient understands and consented to the use of tele-health service.The patient was also informed of the relationship between the physician and patient and the respective role of any other health care provider with respect to management of the patient; and notified that the pt may decline to receive medical services by telemedicine and may withdraw from such care at any time.     Visit type: Virtual visit with synchronous audio and video  Total time spent with patient: 15 minutes       CHIEF COMPLAINT:  No chief complaint on file.      HISTORY OF PRESENTING ILLNESS:  Betina Elmore is a 34 y.o. female with history of ADHD who presents for follow up appointment.      Plan at last appointment on 1/31/25:  Continue Buspar 5 mg BID  Continue Adderall 10 mg TID for ADHD  VS reviewed as stable  UDS negative  PDMP reviewed without discrepancies - last filled 12/17/24 for 30d supply  Denies cp, palpitations, chest tightness  Denies personal cardiac hx, Fhx sudden cardiac death    Interval history as told by Patient -     Patient reports things are going "okay." Report she was sick for two weeks following [place]. Reports she did not take medications during this time. Denies any lingering symptoms at this time. Reports mood has been "okay." Denies any issues with restarting medications. Restarted Buspar as daily and increased back up to BID. Reports she feels Buspar is working well. Reports she is consistent with taking " Buspar. Denies any adverse effects. Reports she still takes Adderall. Denies any adverse effects overall including but not limited to chest pain, chest tightness, palpitations.     PSYCHIATRIC REVIEW OF SYSTEMS:(none/ yes- better/worse/stable/& what symptoms)    Symptoms of Depression: stable    Symptoms of Anxiety/ panic attacks: improved with Buspar    Symptoms of Park/Hypomania: denies    Symptoms of psychosis: denies AVH or paranoia    Sleep: reports she is waking in the night (baseline), reports this is improved    Appetite: stable, drinks water    Other: ADHD well managed    Alcohol: 2-3x per month, no changes    Illicit Drugs: denies    Psychosocial stressors: work, family/interpersonal relationships    Risk Parameters:  Patient reports no suicidal ideation  Patient reports no homicidal ideation  Patient reports no self-injurious behavior  Patient reports no violent behavior    PSYCHIATRIC MED REVIEW    Current psych meds  Medication side effects: denies  Medication compliance: yes, takes drug holiday once per weekend    Previous psych meds trials  Vyvanse - insomnia  Adderall XR - insomnia     PAST PSYCHIATRIC, MEDICAL, AND SOCIAL HISTORY REVIEWED  The patient's past medical, family and social history have been reviewed and updated as appropriate within the electronic medical record - see encounter notes.    MEDICAL REVIEW OF SYSTEMS:  Complete review of systems performed covering Constitutional, Musculoskeletal, Neurologic. All systems negative.    ALL MEDICATIONS:    Current Outpatient Medications:     busPIRone (BUSPAR) 5 MG Tab, Take 1 tablet by mouth twice daily, Disp: 60 tablet, Rfl: 1    clindamycin (CLEOCIN) 300 MG capsule, Take 1 capsule (300 mg total) by mouth 4 (four) times daily., Disp: 28 capsule, Rfl: 1    dextroamphetamine-amphetamine 10 mg Tab, Take 1 tablet (10 mg total) by mouth 3 (three) times daily., Disp: 90 tablet, Rfl: 0    dextroamphetamine-amphetamine 10 mg Tab, Take 1 tablet (10 mg  "total) by mouth 3 (three) times daily., Disp: 90 tablet, Rfl: 0    ibuprofen (ADVIL,MOTRIN) 600 MG tablet, Take 1 tablet by mouth every 6 hours as needed for pain, Disp: 20 tablet, Rfl: 1    sulfamethoxazole-trimethoprim 800-160mg (BACTRIM DS) 800-160 mg Tab, TAKE 1 TABLET BY MOUTH TWICE DAILY, Disp: 6 tablet, Rfl: 0    traMADoL (ULTRAM) 50 mg tablet, take one tablet every 4 hours as needed for pain, Disp: 16 tablet, Rfl: 0    valACYclovir (VALTREX) 1000 MG tablet, take 2 tablets by mouth every 12 hours, Disp: 8 tablet, Rfl: 1    valACYclovir (VALTREX) 1000 MG tablet, Take 1 tablet (1,000 mg total) by mouth 2 (two) times a day., Disp: 20 tablet, Rfl: 3    *Completed 5d course of abx for UTI     ALLERGIES:  Review of patient's allergies indicates:   Allergen Reactions    Penicillin g Hives and Itching       RELEVANT LABS/STUDIES:    No results found for: "WBC", "HGB", "HCT", "MCV", "PLT"  BMP  No results found for: "NA", "K", "CL", "CO2", "BUN", "CREATININE", "CALCIUM", "ANIONGAP", "ESTGFRAFRICA", "EGFRNONAA"  No results found for: "ALT", "AST", "GGT", "ALKPHOS", "BILITOT"  No results found for: "TSH"  No results found for: "LABA1C", "HGBA1C"    VITALS  There were no vitals filed for this visit.    PHYSICAL EXAM  General: well developed, well nourished  Neurologic:   Gait: Normal   Psychomotor signs:  No involuntary movements or tremor  AIMS: none    PSYCHIATRIC EXAM:     Mental Status Exam:  Appearance: unremarkable, age appropriate  Behavior/Cooperation: normal, cooperative  Speech: normal tone, normal rate, normal pitch, normal volume  Language: uses words appropriately; NO aphasia or dysarthria  Mood:  "good"  Affect: full, reactive, appropriate  Thought Process: normal and logical  Thought Content: normal, no suicidality, no homicidality, delusions, or paranoia  Level of Consciousness: Alert and Oriented x3  Memory:  Intact   3/3 immediate, 3/3 at 5 minutes    Recent:   Intact; able to report recent " events   Remote:  Intact; Named 4/4 past presidents   Attention/concentration: appropriate for age/education.   Fund of Knowledge: appears adequate  Insight:   Intact  Judgment:  Intact       IMPRESSION:    Betina Elmore is a 34 y.o. female with history of ADHD who presents for follow up appointment for ADHD and anxiety.    Status/Progress:  Based on the examination today, the patient's problem(s) is/are improved. New problems have not been presented today.     DIAGNOSES:    ICD-10-CM ICD-9-CM   1. Anxiety disorder, unspecified type  F41.9 300.00   2. ADHD (attention deficit hyperactivity disorder), inattentive type  F90.0 314.00       PLAN:  Psych Med:  Continue Buspar 5 mg BID  Continue Adderall 10 mg TID for ADHD  VS reviewed as stable from last visit, next visit in person  UDS negative  PDMP reviewed without discrepancies   Denies cp, palpitations, chest tightness  Denies personal cardiac hx, Fhx sudden cardiac death  Discussed with patient informed consent, risks vs. benefits, alternative treatments, side effect profile and the inherent unpredictability of individual responses to these treatments. Answered any questions patient may have had. The patient expresses understanding of the above and displays the capacity to agree with this current plan     Other: N/A    RETURN TO CLINIC: 8 weeks or sooner as needed, in person for next visit    Evelin Birch MD   [facility] PGY-III  4/4/2025 9:37 AM

## 2025-06-05 ENCOUNTER — OFFICE VISIT (OUTPATIENT)
Dept: PSYCHIATRY | Facility: CLINIC | Age: 35
End: 2025-06-05
Payer: COMMERCIAL

## 2025-06-05 VITALS
SYSTOLIC BLOOD PRESSURE: 113 MMHG | HEART RATE: 77 BPM | BODY MASS INDEX: 21.75 KG/M2 | WEIGHT: 118.94 LBS | DIASTOLIC BLOOD PRESSURE: 78 MMHG

## 2025-06-05 DIAGNOSIS — F90.0 ADHD (ATTENTION DEFICIT HYPERACTIVITY DISORDER), INATTENTIVE TYPE: ICD-10-CM

## 2025-06-05 DIAGNOSIS — F41.9 ANXIETY DISORDER, UNSPECIFIED TYPE: Primary | ICD-10-CM

## 2025-06-05 PROCEDURE — 99999 PR PBB SHADOW E&M-EST. PATIENT-LVL II: CPT | Mod: PBBFAC,,,

## 2025-06-05 RX ORDER — DEXTROAMPHETAMINE SACCHARATE, AMPHETAMINE ASPARTATE, DEXTROAMPHETAMINE SULFATE AND AMPHETAMINE SULFATE 2.5; 2.5; 2.5; 2.5 MG/1; MG/1; MG/1; MG/1
10 TABLET ORAL 3 TIMES DAILY
Qty: 90 TABLET | Refills: 0 | Status: SHIPPED | OUTPATIENT
Start: 2025-06-05

## 2025-06-05 RX ORDER — BUSPIRONE HYDROCHLORIDE 5 MG/1
TABLET ORAL
Qty: 60 TABLET | Refills: 2 | Status: SHIPPED | OUTPATIENT
Start: 2025-06-05

## 2025-08-29 ENCOUNTER — OFFICE VISIT (OUTPATIENT)
Dept: PSYCHIATRY | Facility: CLINIC | Age: 35
End: 2025-08-29
Payer: COMMERCIAL

## 2025-08-29 VITALS
DIASTOLIC BLOOD PRESSURE: 75 MMHG | BODY MASS INDEX: 22.12 KG/M2 | WEIGHT: 120.94 LBS | SYSTOLIC BLOOD PRESSURE: 97 MMHG | HEART RATE: 81 BPM

## 2025-08-29 DIAGNOSIS — F41.1 GAD (GENERALIZED ANXIETY DISORDER): Primary | ICD-10-CM

## 2025-08-29 DIAGNOSIS — F90.0 ADHD (ATTENTION DEFICIT HYPERACTIVITY DISORDER), INATTENTIVE TYPE: ICD-10-CM

## 2025-08-29 PROCEDURE — 99999 PR PBB SHADOW E&M-EST. PATIENT-LVL III: CPT | Mod: PBBFAC,,,

## 2025-08-29 RX ORDER — DEXTROAMPHETAMINE SACCHARATE, AMPHETAMINE ASPARTATE, DEXTROAMPHETAMINE SULFATE AND AMPHETAMINE SULFATE 2.5; 2.5; 2.5; 2.5 MG/1; MG/1; MG/1; MG/1
10 TABLET ORAL 3 TIMES DAILY
Qty: 90 TABLET | Refills: 0 | Status: SHIPPED | OUTPATIENT
Start: 2025-10-08

## 2025-08-29 RX ORDER — DEXTROAMPHETAMINE SACCHARATE, AMPHETAMINE ASPARTATE, DEXTROAMPHETAMINE SULFATE AND AMPHETAMINE SULFATE 2.5; 2.5; 2.5; 2.5 MG/1; MG/1; MG/1; MG/1
10 TABLET ORAL 3 TIMES DAILY
Qty: 90 TABLET | Refills: 0 | Status: SHIPPED | OUTPATIENT
Start: 2025-11-05

## 2025-08-29 RX ORDER — DEXTROAMPHETAMINE SACCHARATE, AMPHETAMINE ASPARTATE, DEXTROAMPHETAMINE SULFATE AND AMPHETAMINE SULFATE 2.5; 2.5; 2.5; 2.5 MG/1; MG/1; MG/1; MG/1
10 TABLET ORAL 3 TIMES DAILY
Qty: 90 TABLET | Refills: 0 | Status: SHIPPED | OUTPATIENT
Start: 2025-09-10